# Patient Record
Sex: MALE | Race: OTHER
[De-identification: names, ages, dates, MRNs, and addresses within clinical notes are randomized per-mention and may not be internally consistent; named-entity substitution may affect disease eponyms.]

---

## 2021-10-25 PROBLEM — Z00.00 ENCOUNTER FOR PREVENTIVE HEALTH EXAMINATION: Status: ACTIVE | Noted: 2021-10-25

## 2021-10-26 ENCOUNTER — APPOINTMENT (OUTPATIENT)
Dept: PULMONOLOGY | Facility: CLINIC | Age: 46
End: 2021-10-26
Payer: COMMERCIAL

## 2021-10-26 VITALS
BODY MASS INDEX: 31.08 KG/M2 | SYSTOLIC BLOOD PRESSURE: 136 MMHG | HEART RATE: 100 BPM | DIASTOLIC BLOOD PRESSURE: 89 MMHG | WEIGHT: 222 LBS | HEIGHT: 71 IN

## 2021-10-26 DIAGNOSIS — R06.83 SNORING: ICD-10-CM

## 2021-10-26 DIAGNOSIS — Z78.9 OTHER SPECIFIED HEALTH STATUS: ICD-10-CM

## 2021-10-26 DIAGNOSIS — F17.200 NICOTINE DEPENDENCE, UNSPECIFIED, UNCOMPLICATED: ICD-10-CM

## 2021-10-26 PROCEDURE — 99243 OFF/OP CNSLTJ NEW/EST LOW 30: CPT

## 2021-10-26 NOTE — HISTORY OF PRESENT ILLNESS
[FreeTextEntry1] : Dr. kaur\par 46 year old man  with history of  is here in the sleep center to address excessive snoring and witnessed apneas.  Patient is sleepy with St[place]al sleepiness score of 12.  Patient has very loud snoring which disturbs his wife, also has witnessed apneas.  Patient's bedtime is around midnight wakes up in the morning around 6 AM.  He feels rested when he wakes up.  Patient drinks 1-2 cups of coffee during the daytime. Patient also has headaches, no history of nocturia.  He is sleepy while driving.\par STOPBANG score - 3\par neck size 17.5 inches\par

## 2021-10-26 NOTE — ASSESSMENT
[FreeTextEntry1] : Patient has symptoms suggestive of sleep apnea. Will order a sleep study. Discussed about details of the study and possible treatment options.\par \par

## 2022-01-06 ENCOUNTER — APPOINTMENT (OUTPATIENT)
Dept: PULMONOLOGY | Facility: CLINIC | Age: 47
End: 2022-01-06
Payer: COMMERCIAL

## 2022-01-06 VITALS
BODY MASS INDEX: 31.08 KG/M2 | SYSTOLIC BLOOD PRESSURE: 135 MMHG | DIASTOLIC BLOOD PRESSURE: 86 MMHG | HEIGHT: 71 IN | WEIGHT: 222 LBS | HEART RATE: 95 BPM

## 2022-01-06 DIAGNOSIS — G47.33 OBSTRUCTIVE SLEEP APNEA (ADULT) (PEDIATRIC): ICD-10-CM

## 2022-01-06 PROCEDURE — 99213 OFFICE O/P EST LOW 20 MIN: CPT

## 2022-01-06 NOTE — ASSESSMENT
[FreeTextEntry1] : 46-year-old man with a mild obstructive sleep apnea and excessive daytime sleepiness.\par \par We will order AutoPap 5 to 20 cm of pressure look at the download data in few weeks time to adjust the pressure.

## 2022-01-06 NOTE — HISTORY OF PRESENT ILLNESS
[FreeTextEntry1] : Dr. kaur\par 46 year old man with no past medical history  is here in the sleep center to address sleep apnea.  Patient is sleepy with [place] sleepiness score of 12.  Patient has very loud snoring which disturbs his wife, also has witnessed apneas.  Patient's bedtime is variable, doesn’t have a set time.  Patient also has headaches.  He is sleepy while driving.\par \par Due to above symptoms patient underwent a sleep study which showed mild obstructive sleep apnea and oxygen desaturations for about half an hour.\par \par Given patient's significant sleep disturbance I think patient will benefit with CPAP therapy.\par